# Patient Record
Sex: MALE | Race: BLACK OR AFRICAN AMERICAN | NOT HISPANIC OR LATINO | Employment: OTHER | URBAN - METROPOLITAN AREA
[De-identification: names, ages, dates, MRNs, and addresses within clinical notes are randomized per-mention and may not be internally consistent; named-entity substitution may affect disease eponyms.]

---

## 2021-04-05 ENCOUNTER — HOSPITAL ENCOUNTER (EMERGENCY)
Facility: HOSPITAL | Age: 31
Discharge: HOME | End: 2021-04-05
Attending: EMERGENCY MEDICINE | Admitting: EMERGENCY MEDICINE
Payer: MEDICARE

## 2021-04-05 VITALS
SYSTOLIC BLOOD PRESSURE: 145 MMHG | WEIGHT: 210 LBS | HEART RATE: 78 BPM | DIASTOLIC BLOOD PRESSURE: 80 MMHG | TEMPERATURE: 98.5 F | OXYGEN SATURATION: 97 % | RESPIRATION RATE: 18 BRPM | HEIGHT: 69 IN | BODY MASS INDEX: 31.1 KG/M2

## 2021-04-05 DIAGNOSIS — D57.00 SICKLE CELL PAIN CRISIS (HCC): Primary | ICD-10-CM

## 2021-04-05 LAB
ALBUMIN SERPL BCP-MCNC: 3.7 G/DL (ref 3.5–5)
ALP SERPL-CCNC: 111 U/L (ref 46–116)
ALT SERPL W P-5'-P-CCNC: 47 U/L (ref 12–78)
ANION GAP SERPL CALCULATED.3IONS-SCNC: 9 MMOL/L (ref 4–13)
ANISOCYTOSIS BLD QL SMEAR: PRESENT
AST SERPL W P-5'-P-CCNC: 27 U/L (ref 5–45)
BASOPHILS # BLD MANUAL: 0.09 THOUSAND/UL (ref 0–0.1)
BASOPHILS NFR MAR MANUAL: 1 % (ref 0–1)
BILIRUB SERPL-MCNC: 2.51 MG/DL (ref 0.2–1)
BUN SERPL-MCNC: 8 MG/DL (ref 5–25)
CALCIUM SERPL-MCNC: 8.8 MG/DL (ref 8.3–10.1)
CHLORIDE SERPL-SCNC: 107 MMOL/L (ref 100–108)
CO2 SERPL-SCNC: 25 MMOL/L (ref 21–32)
CREAT SERPL-MCNC: 0.71 MG/DL (ref 0.6–1.3)
EOSINOPHIL # BLD MANUAL: 0 THOUSAND/UL (ref 0–0.4)
EOSINOPHIL NFR BLD MANUAL: 0 % (ref 0–6)
ERYTHROCYTE [DISTWIDTH] IN BLOOD BY AUTOMATED COUNT: 26.5 % (ref 11.6–15.1)
GFR SERPL CREATININE-BSD FRML MDRD: 148 ML/MIN/1.73SQ M
GLUCOSE SERPL-MCNC: 104 MG/DL (ref 65–140)
HCT VFR BLD AUTO: 20.7 % (ref 36.5–49.3)
HGB BLD-MCNC: 7.2 G/DL (ref 12–17)
LYMPHOCYTES # BLD AUTO: 1.58 THOUSAND/UL (ref 0.6–4.47)
LYMPHOCYTES # BLD AUTO: 17 % (ref 14–44)
MACROCYTES BLD QL AUTO: PRESENT
MCH RBC QN AUTO: 39.8 PG (ref 26.8–34.3)
MCHC RBC AUTO-ENTMCNC: 34.8 G/DL (ref 31.4–37.4)
MCV RBC AUTO: 114 FL (ref 82–98)
MONOCYTES # BLD AUTO: 0.46 THOUSAND/UL (ref 0–1.22)
MONOCYTES NFR BLD: 5 % (ref 4–12)
NEUTROPHILS # BLD MANUAL: 7.15 THOUSAND/UL (ref 1.85–7.62)
NEUTS SEG NFR BLD AUTO: 77 % (ref 43–75)
NRBC BLD AUTO-RTO: 11 /100 WBCS
PLATELET # BLD AUTO: 237 THOUSANDS/UL (ref 149–390)
PLATELET BLD QL SMEAR: ADEQUATE
PMV BLD AUTO: 8.4 FL (ref 8.9–12.7)
POIKILOCYTOSIS BLD QL SMEAR: PRESENT
POLYCHROMASIA BLD QL SMEAR: PRESENT
POTASSIUM SERPL-SCNC: 3.9 MMOL/L (ref 3.5–5.3)
PROT SERPL-MCNC: 7.3 G/DL (ref 6.4–8.2)
RBC # BLD AUTO: 1.81 MILLION/UL (ref 3.88–5.62)
RBC MORPH BLD: PRESENT
SICKLE CELLS BLD QL SMEAR: PRESENT
SODIUM SERPL-SCNC: 141 MMOL/L (ref 136–145)
TOTAL CELLS COUNTED SPEC: 100
WBC # BLD AUTO: 9.29 THOUSAND/UL (ref 4.31–10.16)

## 2021-04-05 PROCEDURE — 80053 COMPREHEN METABOLIC PANEL: CPT | Performed by: EMERGENCY MEDICINE

## 2021-04-05 PROCEDURE — 85027 COMPLETE CBC AUTOMATED: CPT | Performed by: EMERGENCY MEDICINE

## 2021-04-05 PROCEDURE — 96376 TX/PRO/DX INJ SAME DRUG ADON: CPT

## 2021-04-05 PROCEDURE — 99285 EMERGENCY DEPT VISIT HI MDM: CPT | Performed by: EMERGENCY MEDICINE

## 2021-04-05 PROCEDURE — 96374 THER/PROPH/DIAG INJ IV PUSH: CPT

## 2021-04-05 PROCEDURE — 85007 BL SMEAR W/DIFF WBC COUNT: CPT | Performed by: EMERGENCY MEDICINE

## 2021-04-05 PROCEDURE — 36415 COLL VENOUS BLD VENIPUNCTURE: CPT | Performed by: EMERGENCY MEDICINE

## 2021-04-05 PROCEDURE — 96375 TX/PRO/DX INJ NEW DRUG ADDON: CPT

## 2021-04-05 PROCEDURE — 99284 EMERGENCY DEPT VISIT MOD MDM: CPT

## 2021-04-05 PROCEDURE — 85045 AUTOMATED RETICULOCYTE COUNT: CPT | Performed by: EMERGENCY MEDICINE

## 2021-04-05 RX ORDER — KETOROLAC TROMETHAMINE 30 MG/ML
15 INJECTION, SOLUTION INTRAMUSCULAR; INTRAVENOUS ONCE
Status: COMPLETED | OUTPATIENT
Start: 2021-04-05 | End: 2021-04-05

## 2021-04-05 RX ORDER — OXYCODONE HYDROCHLORIDE 10 MG/1
30 TABLET ORAL ONCE
Status: COMPLETED | OUTPATIENT
Start: 2021-04-05 | End: 2021-04-05

## 2021-04-05 RX ORDER — HYDROMORPHONE HCL 110MG/55ML
2 PATIENT CONTROLLED ANALGESIA SYRINGE INTRAVENOUS ONCE
Status: COMPLETED | OUTPATIENT
Start: 2021-04-05 | End: 2021-04-05

## 2021-04-05 RX ORDER — OXYCODONE HYDROCHLORIDE 30 MG/1
30 TABLET ORAL EVERY 4 HOURS PRN
Qty: 12 TABLET | Refills: 0 | Status: SHIPPED | OUTPATIENT
Start: 2021-04-05

## 2021-04-05 RX ADMIN — SODIUM CHLORIDE 1000 ML: 0.9 INJECTION, SOLUTION INTRAVENOUS at 19:01

## 2021-04-05 RX ADMIN — HYDROMORPHONE HYDROCHLORIDE 2 MG: 2 INJECTION, SOLUTION INTRAMUSCULAR; INTRAVENOUS; SUBCUTANEOUS at 17:04

## 2021-04-05 RX ADMIN — OXYCODONE HYDROCHLORIDE 30 MG: 10 TABLET ORAL at 19:33

## 2021-04-05 RX ADMIN — HYDROMORPHONE HYDROCHLORIDE 2 MG: 2 INJECTION, SOLUTION INTRAMUSCULAR; INTRAVENOUS; SUBCUTANEOUS at 19:01

## 2021-04-05 RX ADMIN — KETOROLAC TROMETHAMINE 15 MG: 30 INJECTION, SOLUTION INTRAMUSCULAR at 19:01

## 2021-04-05 NOTE — ED PROVIDER NOTES
History  Chief Complaint   Patient presents with    Sickle Cell Pain Crisis     Pt reports visiting from [facility], is exp pain in his back and hips and does not have his pain medication with him  Patient is a 28-year-old male past medical history of sickle cell disease presenting for pain crisis  Patient states over the last 2 days he has had pain in the bilateral low back and hips, intermittent, stabbing in nature, worse with walking and consistent with his prior sickle cell flares  He states that he is visiting from [facility]  and did not bring his pain medication with him but took Tylenol this morning, roughly 6 hours ago with minimal relief  He states when he comes to the emergency department he normally gets 2 mg of Dilaudid x2 and oxycodone a and pain is not resolved gets admitted  He states that he takes oxycodone 30 mg every 4 hours as well as ibuprofen and Tylenol for pain and that his last flare was 3 weeks ago, normally has 1 every month  Denies any fevers, numbness or tingling, chest pain, shortness of breath, nausea vomiting  None       Past Medical History:   Diagnosis Date    Sickle cell anemia with crisis [facility])        History reviewed  No pertinent surgical history  History reviewed  No pertinent family history  I have reviewed and agree with the history as documented  E-Cigarette/Vaping     E-Cigarette/Vaping Substances     Social History     Tobacco Use    Smoking status: Current Every Day Smoker     Types: Cigarettes    Smokeless tobacco: Never Used   Substance Use Topics    Alcohol use: Yes    Drug use: Not on file       Review of Systems   All other systems reviewed and are negative  Physical Exam  Physical Exam  Vitals signs reviewed  Constitutional:       General: He is not in acute distress  Appearance: Normal appearance  He is not ill-appearing     HENT:      Mouth/Throat:      Mouth: Mucous membranes are moist    Eyes:      Conjunctiva/sclera: Conjunctivae normal    Neck:      Musculoskeletal: Neck supple  Cardiovascular:      Rate and Rhythm: Normal rate and regular rhythm  Heart sounds: Normal heart sounds  Pulmonary:      Effort: Pulmonary effort is normal       Breath sounds: Normal breath sounds  Abdominal:      General: Abdomen is flat  Palpations: Abdomen is soft  Tenderness: There is no abdominal tenderness  Musculoskeletal: Normal range of motion  General: Tenderness present  No swelling  Comments: No spinal tenderness, bilateral hip tender   Skin:     General: Skin is warm and dry  Neurological:      General: No focal deficit present  Mental Status: He is alert  Sensory: No sensory deficit  Motor: No weakness        Gait: Gait normal    Psychiatric:         Mood and Affect: Mood normal          Vital Signs  ED Triage Vitals [04/05/21 1425]   Temperature Pulse Respirations Blood Pressure SpO2   98 5 °F (36 9 °C) 96 19 136/71 97 %      Temp Source Heart Rate Source Patient Position - Orthostatic VS BP Location FiO2 (%)   Oral Monitor Sitting Left arm --      Pain Score       9           Vitals:    04/05/21 1425 04/05/21 1704 04/05/21 1934   BP: 136/71 152/84 145/80   Pulse: 96 76 78   Patient Position - Orthostatic VS: Sitting  Sitting         Visual Acuity      ED Medications  Medications   HYDROmorphone (DILAUDID) injection 2 mg (2 mg Intravenous Given 4/5/21 1704)   sodium chloride 0 9 % bolus 1,000 mL (0 mL Intravenous Stopped 4/5/21 1931)   HYDROmorphone (DILAUDID) injection 2 mg (2 mg Intravenous Given 4/5/21 1901)   ketorolac (TORADOL) injection 15 mg (15 mg Intravenous Given 4/5/21 1901)   oxyCODONE (ROXICODONE) immediate release tablet 30 mg (30 mg Oral Given 4/5/21 1933)       Diagnostic Studies  Results Reviewed     Procedure Component Value Units Date/Time    CBC and differential [[id number]]  (Abnormal) Collected: 04/05/21 1701    Lab Status: Final result Specimen: Blood from Central Venous Line Updated: 04/05/21 1758     WBC 9 29 Thousand/uL      RBC 1 81 Million/uL      Hemoglobin 7 2 g/dL      Hematocrit 20 7 %       fL      MCH 39 8 pg      MCHC 34 8 g/dL      RDW 26 5 %      MPV 8 4 fL      Platelets 057 Thousands/uL      nRBC 11 /100 WBCs     Narrative: This is an appended report  These results have been appended to a previously verified report      Manual Differential(PHLEBS Do Not Order) [[id number]]  (Abnormal) Collected: 04/05/21 1701    Lab Status: Final result Specimen: Blood from Central Venous Line Updated: 04/05/21 1758     Segmented % 77 %      Lymphocytes % 17 %      Monocytes % 5 %      Eosinophils, % 0 %      Basophils % 1 %      Absolute Neutrophils 7 15 Thousand/uL      Lymphocytes Absolute 1 58 Thousand/uL      Monocytes Absolute 0 46 Thousand/uL      Eosinophils Absolute 0 00 Thousand/uL      Basophils Absolute 0 09 Thousand/uL      Total Counted 100     RBC Morphology Present     Anisocytosis Present     Macrocytes Present     Poikilocytes Present     Polychromasia Present     Sickle Cells Present     Platelet Estimate Adequate    Reticulocytes [[id number]]  (Abnormal) Collected: 04/05/21 1701    Lab Status: Final result Specimen: Blood from Central Venous Line Updated: 04/05/21 1758     Retic Ct Abs <100,000     Retic Ct Pct 25 19 %     Comprehensive metabolic panel [[id number]]  (Abnormal) Collected: 04/05/21 1701    Lab Status: Final result Specimen: Blood from Central Venous Line Updated: 04/05/21 1720     Sodium 141 mmol/L      Potassium 3 9 mmol/L      Chloride 107 mmol/L      CO2 25 mmol/L      ANION GAP 9 mmol/L      BUN 8 mg/dL      Creatinine 0 71 mg/dL      Glucose 104 mg/dL      Calcium 8 8 mg/dL      AST 27 U/L      ALT 47 U/L      Alkaline Phosphatase 111 U/L      Total Protein 7 3 g/dL      Albumin 3 7 g/dL      Total Bilirubin 2 51 mg/dL      eGFR 148 ml/min/1 73sq m     Narrative:      [place] guidelines for Chronic Kidney Disease (CKD):     Stage 1 with normal or high GFR (GFR > 90 mL/min/1 73 square meters)    Stage 2 Mild CKD (GFR = 60-89 mL/min/1 73 square meters)    Stage 3A Moderate CKD (GFR = 45-59 mL/min/1 73 square meters)    Stage 3B Moderate CKD (GFR = 30-44 mL/min/1 73 square meters)    Stage 4 Severe CKD (GFR = 15-29 mL/min/1 73 square meters)    Stage 5 End Stage CKD (GFR <15 mL/min/1 73 square meters)  Note: GFR calculation is accurate only with a steady state creatinine                 No orders to display              Procedures  Procedures         ED Course  ED Course as of Apr 05 2000 Mon Apr 05, 2021   1759 Patient with hemoglobin at baseline per prior notes, appropriately increased reticulocyte count, still noting severe pain after 1 dose of Dilaudid and stating that he normally receives fluids, will give 1 L normal saline and 2nd dose of Dilaudid  1919 Patient states he is ready to be discharged  1919 Have checked PDMP and patient has prescriptions filled by same provider roughly every 2 weeks, last filled nearly 2 weeks ago  Will give short course of oxycodone 30 mg until patient may return home  SBIRT 22yo+      Most Recent Value   SBIRT (24 yo +)   In order to provide better care to our patients, we are screening all of our patients for alcohol and drug use  Would it be okay to ask you these screening questions? Yes Filed at: 04/05/2021 1918   Initial Alcohol Screen: US AUDIT-C    1  How often do you have a drink containing alcohol?  0 Filed at: 04/05/2021 1918   2  How many drinks containing alcohol do you have on a typical day you are drinking? 0 Filed at: 04/05/2021 1918   3a  Male UNDER 65: How often do you have five or more drinks on one occasion? 0 Filed at: 04/05/2021 1918   Audit-C Score  0 Filed at: 04/05/2021 1918   LARS: How many times in the past year have you       Used an illegal drug or used a prescription medication for non-medical reasons? Never Filed at: 04/05/2021 1918                    [facility]  Number of Diagnoses or Management Options  Diagnosis management comments: Patient is a 61-year-old male past medical history of sickle cell trait presenting for pain crisis  Patient is well-appearing at bedside with stable vitals and in no acute distress  He has lungs clear to auscultation, heart rate 96, reports no chest pain or shortness of breath and reports pain consistent with his prior sickle cell crises, ambulatory bedside with intact sensation, pulses, and no significant physical exam findings  Will obtain labs as patient has no history here, administer pain control and reassess  Disposition  Final diagnoses:   Sickle cell pain crisis [facility])     Time reflects when diagnosis was documented in both MDM as applicable and the Disposition within this note     Time User Action Codes Description Comment    4/5/2021  7:25 PM Ingrid Ken Add [D57 00] Sickle cell pain crisis [facility])       ED Disposition     ED Disposition Condition Date/Time Comment    Discharge Stable Mon Apr 5, 2021  7:25 PM Beula Flair discharge to home/self care  Follow-up Information     Follow up With Specialties Details Why Contact Info    Your Hematologist              Discharge Medication List as of 4/5/2021  7:27 PM      START taking these medications    Details   oxyCODONE (ROXICODONE) 30 MG immediate release tablet Take 1 tablet (30 mg total) by mouth every 4 (four) hours as needed for moderate pain for up to 12 dosesMax Daily Amount: 180 mg, Starting Mon 4/5/2021, Print           No discharge procedures on file      PDMP Review     None          ED Provider  Electronically Signed by           Jose Luis Wallace DO  04/05/21 2000

## 2021-04-06 LAB
RETICS # AUTO: ABNORMAL 10*3/UL (ref 14356–105094)
RETICS # CALC: 25.19 % (ref 0.37–1.87)

## 2022-07-11 ENCOUNTER — HOSPITAL ENCOUNTER (EMERGENCY)
Facility: HOSPITAL | Age: 32
Discharge: HOME | End: 2022-07-11
Attending: EMERGENCY MEDICINE
Payer: MEDICARE

## 2022-07-11 VITALS
TEMPERATURE: 97.3 F | DIASTOLIC BLOOD PRESSURE: 57 MMHG | BODY MASS INDEX: 26.52 KG/M2 | SYSTOLIC BLOOD PRESSURE: 101 MMHG | OXYGEN SATURATION: 96 % | HEIGHT: 68 IN | WEIGHT: 175 LBS | HEART RATE: 82 BPM | RESPIRATION RATE: 16 BRPM

## 2022-07-11 DIAGNOSIS — D57.00 SICKLE CELL PAIN CRISIS (HCC): Primary | ICD-10-CM

## 2022-07-11 LAB
ALBUMIN SERPL BCP-MCNC: 3 G/DL (ref 3.5–5)
ALP SERPL-CCNC: 401 U/L (ref 46–116)
ALT SERPL W P-5'-P-CCNC: 46 U/L (ref 12–78)
ANION GAP SERPL CALCULATED.3IONS-SCNC: 7 MMOL/L (ref 4–13)
ANISOCYTOSIS BLD QL SMEAR: PRESENT
AST SERPL W P-5'-P-CCNC: 54 U/L (ref 5–45)
BASOPHILS # BLD MANUAL: 0 THOUSAND/UL (ref 0–0.1)
BASOPHILS NFR MAR MANUAL: 0 % (ref 0–1)
BILIRUB SERPL-MCNC: 1.64 MG/DL (ref 0.2–1)
BUN SERPL-MCNC: 16 MG/DL (ref 5–25)
CALCIUM ALBUM COR SERPL-MCNC: 9.2 MG/DL (ref 8.3–10.1)
CALCIUM SERPL-MCNC: 8.4 MG/DL (ref 8.3–10.1)
CHLORIDE SERPL-SCNC: 107 MMOL/L (ref 100–108)
CO2 SERPL-SCNC: 24 MMOL/L (ref 21–32)
CREAT SERPL-MCNC: 1.12 MG/DL (ref 0.6–1.3)
EOSINOPHIL # BLD MANUAL: 0.07 THOUSAND/UL (ref 0–0.4)
EOSINOPHIL NFR BLD MANUAL: 1 % (ref 0–6)
ERYTHROCYTE [DISTWIDTH] IN BLOOD BY AUTOMATED COUNT: 30.5 % (ref 11.6–15.1)
GFR SERPL CREATININE-BSD FRML MDRD: 88 ML/MIN/1.73SQ M
GLUCOSE SERPL-MCNC: 95 MG/DL (ref 65–140)
HCT VFR BLD AUTO: 17.8 % (ref 36.5–49.3)
HGB BLD-MCNC: 6.2 G/DL (ref 12–17)
LYMPHOCYTES # BLD AUTO: 3.09 THOUSAND/UL (ref 0.6–4.47)
LYMPHOCYTES # BLD AUTO: 42 % (ref 14–44)
MCH RBC QN AUTO: 35.6 PG (ref 26.8–34.3)
MCHC RBC AUTO-ENTMCNC: 34.8 G/DL (ref 31.4–37.4)
MCV RBC AUTO: 102 FL (ref 82–98)
MONOCYTES # BLD AUTO: 0.37 THOUSAND/UL (ref 0–1.22)
MONOCYTES NFR BLD: 5 % (ref 4–12)
MYELOCYTES NFR BLD MANUAL: 1 % (ref 0–1)
NEUTROPHILS # BLD MANUAL: 3.75 THOUSAND/UL (ref 1.85–7.62)
NEUTS SEG NFR BLD AUTO: 51 % (ref 43–75)
NRBC BLD AUTO-RTO: 18 /100 WBC (ref 0–2)
PLATELET # BLD AUTO: 557 THOUSANDS/UL (ref 149–390)
PLATELET BLD QL SMEAR: ABNORMAL
PMV BLD AUTO: 8.3 FL (ref 8.9–12.7)
POLYCHROMASIA BLD QL SMEAR: PRESENT
POTASSIUM SERPL-SCNC: 3.7 MMOL/L (ref 3.5–5.3)
PROT SERPL-MCNC: 7.6 G/DL (ref 6.4–8.2)
RBC # BLD AUTO: 1.74 MILLION/UL (ref 3.88–5.62)
RETICS # AUTO: ABNORMAL 10*3/UL (ref 14356–105094)
RETICS # CALC: 22.32 % (ref 0.37–1.87)
SICKLE CELLS BLD QL SMEAR: PRESENT
SODIUM SERPL-SCNC: 138 MMOL/L (ref 136–145)
WBC # BLD AUTO: 7.36 THOUSAND/UL (ref 4.31–10.16)

## 2022-07-11 PROCEDURE — 80053 COMPREHEN METABOLIC PANEL: CPT | Performed by: EMERGENCY MEDICINE

## 2022-07-11 PROCEDURE — 96361 HYDRATE IV INFUSION ADD-ON: CPT

## 2022-07-11 PROCEDURE — 96374 THER/PROPH/DIAG INJ IV PUSH: CPT

## 2022-07-11 PROCEDURE — 85045 AUTOMATED RETICULOCYTE COUNT: CPT | Performed by: EMERGENCY MEDICINE

## 2022-07-11 PROCEDURE — 85027 COMPLETE CBC AUTOMATED: CPT | Performed by: EMERGENCY MEDICINE

## 2022-07-11 PROCEDURE — 99284 EMERGENCY DEPT VISIT MOD MDM: CPT

## 2022-07-11 PROCEDURE — 96376 TX/PRO/DX INJ SAME DRUG ADON: CPT

## 2022-07-11 PROCEDURE — 99285 EMERGENCY DEPT VISIT HI MDM: CPT | Performed by: EMERGENCY MEDICINE

## 2022-07-11 PROCEDURE — 85007 BL SMEAR W/DIFF WBC COUNT: CPT | Performed by: EMERGENCY MEDICINE

## 2022-07-11 PROCEDURE — 36415 COLL VENOUS BLD VENIPUNCTURE: CPT | Performed by: EMERGENCY MEDICINE

## 2022-07-11 RX ORDER — OXYCODONE HYDROCHLORIDE 30 MG/1
30 TABLET ORAL EVERY 6 HOURS PRN
Qty: 20 TABLET | Refills: 0 | Status: SHIPPED | OUTPATIENT
Start: 2022-07-11 | End: 2022-07-21

## 2022-07-11 RX ORDER — OXYCODONE HYDROCHLORIDE 30 MG/1
30 TABLET ORAL EVERY 6 HOURS PRN
Qty: 20 TABLET | Refills: 0 | Status: SHIPPED | OUTPATIENT
Start: 2022-07-11 | End: 2022-07-11 | Stop reason: SDUPTHER

## 2022-07-11 RX ORDER — HYDROMORPHONE HCL 110MG/55ML
2 PATIENT CONTROLLED ANALGESIA SYRINGE INTRAVENOUS ONCE
Status: COMPLETED | OUTPATIENT
Start: 2022-07-11 | End: 2022-07-11

## 2022-07-11 RX ADMIN — HYDROMORPHONE HYDROCHLORIDE 2 MG: 2 INJECTION, SOLUTION INTRAMUSCULAR; INTRAVENOUS; SUBCUTANEOUS at 08:31

## 2022-07-11 RX ADMIN — HYDROMORPHONE HYDROCHLORIDE 2 MG: 2 INJECTION, SOLUTION INTRAMUSCULAR; INTRAVENOUS; SUBCUTANEOUS at 07:53

## 2022-07-11 RX ADMIN — SODIUM CHLORIDE 1000 ML: 0.9 INJECTION, SOLUTION INTRAVENOUS at 07:53

## 2022-07-11 RX ADMIN — HYDROMORPHONE HYDROCHLORIDE 2 MG: 2 INJECTION, SOLUTION INTRAMUSCULAR; INTRAVENOUS; SUBCUTANEOUS at 09:10

## 2022-07-11 NOTE — ED PROVIDER NOTES
History  Chief Complaint   Patient presents with    Sickle Cell Pain Crisis     Has had flare up for 2 days, taking his medicine but is not working  C/o lower back pain     44-year-old male presents in sickle cell pain crisis - pain to his thoracic and lumbar back  His visiting from [place]  He states that this feels like his usual sickle cell pain crisis however his home pain medication of oxycodone 30 mg is insufficient to control his symptoms at home  Patient denies any chest pain, no shortness of breath, no fevers or chills, no abnormal pains from his usual           Prior to Admission Medications   Prescriptions Last Dose Informant Patient Reported? Taking?   oxyCODONE (ROXICODONE) 30 MG immediate release tablet   No No   Sig: Take 1 tablet (30 mg total) by mouth every 4 (four) hours as needed for moderate pain for up to 12 dosesMax Daily Amount: 180 mg      Facility-Administered Medications: None       Past Medical History:   Diagnosis Date    Sickle cell anemia with crisis [facility])        Past Surgical History:   Procedure Laterality Date    SICKLE CELL (HISTORICAL)         History reviewed  No pertinent family history  I have reviewed and agree with the history as documented  E-Cigarette/Vaping    E-Cigarette Use Never User      E-Cigarette/Vaping Substances    Nicotine No     THC No     CBD No     Flavoring No     Other No     Unknown No      Social History     Tobacco Use    Smoking status: Current Every Day Smoker     Packs/day: 0 50     Types: Cigarettes    Smokeless tobacco: Never Used   Vaping Use    Vaping Use: Never used   Substance Use Topics    Alcohol use: Yes    Drug use: Not Currently       Review of Systems   Constitutional: Negative for chills and fever  HENT: Negative for congestion and rhinorrhea  Respiratory: Negative for chest tightness and shortness of breath  Cardiovascular: Negative for chest pain and leg swelling     Gastrointestinal: Negative for abdominal pain, nausea and vomiting  Genitourinary: Negative for dysuria and flank pain  Musculoskeletal: Positive for back pain  Negative for neck pain  Skin: Negative for wound  Neurological: Negative for dizziness, weakness, light-headedness, numbness and headaches  Physical Exam  Physical Exam  Vitals reviewed  Constitutional:       General: He is not in acute distress  Appearance: He is well-developed  He is not diaphoretic  Comments: Appears uncomfortable but nontoxic and in no acute distress   HENT:      Head: Normocephalic and atraumatic  Eyes:      Conjunctiva/sclera: Conjunctivae normal    Cardiovascular:      Rate and Rhythm: Normal rate and regular rhythm  Pulmonary:      Effort: Pulmonary effort is normal  No respiratory distress  Breath sounds: Normal breath sounds  Abdominal:      General: Abdomen is flat  Tenderness: There is no abdominal tenderness  Musculoskeletal:         General: Tenderness (Thoracic and lumbar, no midline pain) present  Normal range of motion  Cervical back: Normal range of motion and neck supple  Skin:     General: Skin is warm and dry  Coloration: Skin is not pale  Neurological:      Mental Status: He is alert and oriented to person, place, and time  Cranial Nerves: No cranial nerve deficit     Psychiatric:         Behavior: Behavior normal          Vital Signs  ED Triage Vitals [07/11/22 0704]   Temperature Pulse Respirations Blood Pressure SpO2   (!) 97 3 °F (36 3 °C) 94 16 133/63 98 %      Temp Source Heart Rate Source Patient Position - Orthostatic VS BP Location FiO2 (%)   Tympanic Monitor Sitting Left arm --      Pain Score       8           Vitals:    07/11/22 0704 07/11/22 0913   BP: 133/63 101/57   Pulse: 94 82   Patient Position - Orthostatic VS: Sitting Lying         Visual Acuity  Visual Acuity    Flowsheet Row Most Recent Value   L Pupil Size (mm) 3   R Pupil Size (mm) 3          ED Medications  Medications sodium chloride 0 9 % bolus 1,000 mL (0 mL Intravenous Stopped 7/11/22 0907)   HYDROmorphone (DILAUDID) injection 2 mg (2 mg Intravenous Given 7/11/22 0753)   HYDROmorphone (DILAUDID) injection 2 mg (2 mg Intravenous Given 7/11/22 0831)   HYDROmorphone (DILAUDID) injection 2 mg (2 mg Intravenous Given 7/11/22 0910)       Diagnostic Studies  Results Reviewed     Procedure Component Value Units Date/Time    CBC and differential [[id number]]  (Abnormal) Collected: 07/11/22 0756    Lab Status: Final result Specimen: Blood from Central Venous Line Updated: 07/11/22 0855     WBC 7 36 Thousand/uL      RBC 1 74 Million/uL      Hemoglobin 6 2 g/dL      Hematocrit 17 8 %       fL      MCH 35 6 pg      MCHC 34 8 g/dL      RDW 30 5 %      MPV 8 3 fL      Platelets 017 Thousands/uL     Manual Differential(PHLEBS Do Not Order) [[id number]]  (Abnormal) Collected: 07/11/22 0756    Lab Status: Final result Specimen: Blood from Central Venous Line Updated: 07/11/22 0855     Segmented % 51 %      Lymphocytes % 42 %      Monocytes % 5 %      Eosinophils, % 1 %      Basophils % 0 %      Myelocytes % 1 %      Absolute Neutrophils 3 75 Thousand/uL      Lymphocytes Absolute 3 09 Thousand/uL      Monocytes Absolute 0 37 Thousand/uL      Eosinophils Absolute 0 07 Thousand/uL      Basophils Absolute 0 00 Thousand/uL      Total Counted --     nRBC 18 /100 WBC      Anisocytosis Present     Polychromasia Present     Sickle Cells Present     Platelet Estimate Increased    Reticulocytes [[id number]]  (Abnormal) Collected: 07/11/22 0756    Lab Status: Final result Specimen: Blood from Central Venous Line Updated: 07/11/22 0854     Retic Ct Abs 388,400     Retic Ct Pct 22 32 %     Comprehensive metabolic panel [[id number]]  (Abnormal) Collected: 07/11/22 0756    Lab Status: Final result Specimen: Blood from Central Venous Line Updated: 07/11/22 0838     Sodium 138 mmol/L      Potassium 3 7 mmol/L      Chloride 107 mmol/L      CO2 24 mmol/L ANION GAP 7 mmol/L      BUN 16 mg/dL      Creatinine 1 12 mg/dL      Glucose 95 mg/dL      Calcium 8 4 mg/dL      Corrected Calcium 9 2 mg/dL      AST 54 U/L      ALT 46 U/L      Alkaline Phosphatase 401 U/L      Total Protein 7 6 g/dL      Albumin 3 0 g/dL      Total Bilirubin 1 64 mg/dL      eGFR 88 ml/min/1 73sq m     Narrative:      [place] guidelines for Chronic Kidney Disease (CKD):     Stage 1 with normal or high GFR (GFR > 90 mL/min/1 73 square meters)    Stage 2 Mild CKD (GFR = 60-89 mL/min/1 73 square meters)    Stage 3A Moderate CKD (GFR = 45-59 mL/min/1 73 square meters)    Stage 3B Moderate CKD (GFR = 30-44 mL/min/1 73 square meters)    Stage 4 Severe CKD (GFR = 15-29 mL/min/1 73 square meters)    Stage 5 End Stage CKD (GFR <15 mL/min/1 73 square meters)  Note: GFR calculation is accurate only with a steady state creatinine                 No orders to display              Procedures  Procedures         ED Course  ED Course as of 07/11/22 1550   Mon Jul 11, 2022   0856 Hemoglobin(!!): 6 2  Patient's baseline - does not require transfusion at this time  States he does not frequently get blood transfusions unless under 6  SBIRT 20yo+    Flowsheet Row Most Recent Value   SBIRT (25 yo +)    In order to provide better care to our patients, we are screening all of our patients for alcohol and drug use  Would it be okay to ask you these screening questions? Yes Filed at: 07/11/2022 0758   Initial Alcohol Screen: US AUDIT-C     1  How often do you have a drink containing alcohol? 0 Filed at: 07/11/2022 0758   2  How many drinks containing alcohol do you have on a typical day you are drinking? 0 Filed at: 07/11/2022 0758   3a  Male UNDER 65: How often do you have five or more drinks on one occasion? 0 Filed at: 07/11/2022 0758   3b  FEMALE Any Age, or MALE 65+: How often do you have 4 or more drinks on one occassion?  0 Filed at: 07/11/2022 9409 Audit-C Score 0 Filed at: 07/11/2022 9496   LARS: How many times in the past year have you    Used an illegal drug or used a prescription medication for non-medical reasons? Never Filed at: 07/11/2022 0758                    MDM  Number of Diagnoses or Management Options  Sickle cell pain crisis [facility])  Diagnosis management comments: Sickle cell pain crisis, controlled with Dilaudid, IV fluid  Patient's hemoglobin is 6 2 but this is close to patient's baseline, he states he does not get blood transfusion unless his Hemoglobin is less than 6  Patient feels improved  No aplastic crisis  No signs of infection  No CP  He is out of his home pain medicine  Discharged with his home pain medicine of oxycodone 30 mg  Advised follow-up with his hematology doctor back home  Discharged in stable condition  Amount and/or Complexity of Data Reviewed  Clinical lab tests: ordered and reviewed        Disposition  Final diagnoses:   Sickle cell pain crisis ([address] )     Time reflects when diagnosis was documented in both MDM as applicable and the Disposition within this note     Time User Action Codes Description Comment    7/11/2022  9:02 AM Ricky Mcguire Add [D57 00] Sickle cell pain crisis [facility])       ED Disposition     ED Disposition   Discharge    Condition   Stable    Date/Time   Mon Jul 11, 2022  9:02 AM    Comment   Adriana Gale discharge to home/self care                 Follow-up Information     Follow up With Specialties Details Why Contact Info Additional Information    [address] [facility] Emergency Department Emergency Medicine  If symptoms worsen [address] Emergency Department, [address] [place] [address], [id number]          Discharge Medication List as of 7/11/2022  9:08 AM      START taking these medications    Details   !! oxyCODONE (Roxicodone) 30 MG immediate release tablet Take 1 tablet (30 mg total) by mouth every 6 (six) hours as needed for severe pain for up to 10 days Max Daily Amount: 120 mg, Starting Mon 7/11/2022, Until Thu 7/21/2022 at 2359, Normal       !! - Potential duplicate medications found  Please discuss with provider  CONTINUE these medications which have NOT CHANGED    Details   !! oxyCODONE (ROXICODONE) 30 MG immediate release tablet Take 1 tablet (30 mg total) by mouth every 4 (four) hours as needed for moderate pain for up to 12 dosesMax Daily Amount: 180 mg, Starting Mon 4/5/2021, Print       !! - Potential duplicate medications found  Please discuss with provider  No discharge procedures on file      PDMP Review     None          ED Provider  Electronically Signed by           Michelle James DO  07/11/22 2724

## 2022-07-11 NOTE — DISCHARGE INSTRUCTIONS
Please follow-up with your hematology doctor back home  Please return to the emergency department if you have worsening symptoms, chest pain, uncontrollable pain, etcetera